# Patient Record
(demographics unavailable — no encounter records)

---

## 2024-11-20 NOTE — PHYSICAL EXAM
[Alert] : alert [Normal Voice/Communication] : normal voice/communication [Healthy Appearing] : healthy appearing [No Acute Distress] : no acute distress [Sclera] : the sclera and conjunctiva were normal [Hearing Threshold Finger Rub Not [place]] : hearing was normal [Normal Lips/Gums] : the lips and gums were normal [Oropharynx] : the oropharynx was normal [Normal Appearance] : the appearance of the neck was normal [No Neck Mass] : no neck mass was observed [No Respiratory Distress] : no respiratory distress [No Acc Muscle Use] : no accessory muscle use [Respiration, Rhythm And Depth] : normal respiratory rhythm and effort [Auscultation Breath Sounds / Voice Sounds] : lungs were clear to auscultation bilaterally [Heart Rate And Rhythm] : heart rate was normal and rhythm regular [Normal S1, S2] : normal S1 and S2 [Murmurs] : no murmurs [Bowel Sounds] : normal bowel sounds [Abdomen Tenderness] : non-tender [No Masses] : no abdominal mass palpated [Abdomen Soft] : soft [] : no hepatosplenomegaly [Oriented To Time, Place, And Person] : oriented to person, place, and time

## 2024-11-20 NOTE — ASSESSMENT
[FreeTextEntry1] : 26 yo male, hx of SIBO, previously treated with Xifaxin. COmplains of bloating, eructation. Has also epigastric pain NO nsaids. Denies weight loss, anorexia, change in bowel habits. No previous Hpylori testing nor sonogram of abdomen. Paternal hx of gallstones. NO recent travel or abx.  IMP: 1. exclude Hpylori 2. Possible cholelithiasis 3. Doubt recurrent/persistent SIBO 5. Possilble IBS  PLAN: 1. UBT 2. Sonogram of abdomen 3. cbc, celiac panel 4. RTO 4-6 weeks

## 2024-11-20 NOTE — HISTORY OF PRESENT ILLNESS
[FreeTextEntry1] : 28 yo male, hx of SIBO, previously treated with Xifaxin. COmplains of bloating, eructation. Has also epigastric pain NO nsaids. Denies weight loss, anorexia, change in bowel habits. No previous Hpylori testing nor sonogram of abdomen. Paternal hx of gallstones. NO recent travel or abx.

## 2025-01-28 NOTE — HISTORY OF PRESENT ILLNESS
[FreeTextEntry1] : 27 yo male, hx of SIBO, previously treated with Xifaxin. COmplains of bloating, eructation. Has also epigastric pain NO nsaids. Denies weight loss, anorexia, change in bowel habits. No previous Hpylori testing nor sonogram of abdomen. Paternal hx of gallstones. NO recent travel or abx.  RTO 1/28/25- abdominal sonogram was normal; hpylori ubt neg, cbc, cmp, celiac neg. States even water bloats and belching. Several years ago had course of xifaxan. Has occ constipation. [de-identified] : 11/2024 normal

## 2025-01-28 NOTE — ASSESSMENT
[FreeTextEntry1] : 29 yo male, hx of SIBO, previously treated with Xifaxin. COmplains of bloating, eructation. Has also epigastric pain NO nsaids. Denies weight loss, anorexia, change in bowel habits. No previous Hpylori testing nor sonogram of abdomen. Paternal hx of gallstones. NO recent travel or abx.  RTO 1/28/25- abdominal sonogram was normal; hpylori ubt neg, cbc, cmp, celiac neg. States even water bloats and belching. Several years ago had course of xifaxan. Has occ constipation. IMP: 1. SIBO 2. IBS PLAN: 1. xifaxan 550mg po tid x 14 days 2. RTO 3months

## 2025-03-17 NOTE — HISTORY OF PRESENT ILLNESS
[FreeTextEntry1] : 29 yo male, hx of SIBO, previously treated with Xifaxin. COmplains of bloating, eructation. Has also epigastric pain NO nsaids. Denies weight loss, anorexia, change in bowel habits. No previous Hpylori testing nor sonogram of abdomen. Paternal hx of gallstones. NO recent travel or abx.  RTO 1/28/25- abdominal sonogram was normal; hpylori ubt neg, cbc, cmp, celiac neg. States even water bloats and belching. Several years ago had course of xifaxan. Has occ constipation.  RTO 3/17/25- no help after one course xifaxan. Still with upper abdominal bloating. No weight loss. no n/v has regular bms. [de-identified] : 11/2024 normal

## 2025-03-17 NOTE — ASSESSMENT
[FreeTextEntry1] : 29 yo male, hx of SIBO, previously treated with Xifaxin. COmplains of bloating, eructation. Has also epigastric pain NO nsaids. Denies weight loss, anorexia, change in bowel habits. No previous Hpylori testing nor sonogram of abdomen. Paternal hx of gallstones. NO recent travel or abx.  RTO 1/28/25- abdominal sonogram was normal; hpylori ubt neg, cbc, cmp, celiac neg. States even water bloats and belching. Several years ago had course of xifaxan. Has occ constipation.  RTO 3/17/25- no help after one course xifaxan. Still with upper abdominal bloating. No weight loss. no n/v has regular bms. IMP: 1. SIBO 2. IBS PLAN: 1. Bactrim ds bid x 10 days 2. after above, . xifaxan 550mg po tid x 14 days 3 RTO 2 months for SIBO followup 4. hold culturelle

## 2025-05-01 NOTE — HISTORY OF PRESENT ILLNESS
[FreeTextEntry1] : 27 yo male, hx of SIBO, previously treated with Xifaxin. COmplains of bloating, eructation. Has also epigastric pain NO nsaids. Denies weight loss, anorexia, change in bowel habits. No previous Hpylori testing nor sonogram of abdomen. Paternal hx of gallstones. NO recent travel or abx.  RTO 1/28/25- abdominal sonogram was normal; hpylori ubt neg, cbc, cmp, celiac neg. States even water bloats and belching. Several years ago had course of xifaxan. Has occ constipation.  RTO 3/17/25- no help after one course xifaxan. Still with upper abdominal bloating. No weight loss. no n/v has regular bms.  RTO 5/1/25- using [facility] fiber supplement daily for SIBO, and MVI. no help with Bactrim wrt bloating. COncerned about sibo and bloating. [de-identified] : 11/2024 normal

## 2025-05-01 NOTE — ASSESSMENT
[FreeTextEntry1] : 27 yo male, hx of SIBO, previously treated with Xifaxin. COmplains of bloating, eructation. Has also epigastric pain NO nsaids. Denies weight loss, anorexia, change in bowel habits. No previous Hpylori testing nor sonogram of abdomen. Paternal hx of gallstones. NO recent travel or abx.  RTO 1/28/25- abdominal sonogram was normal; hpylori ubt neg, cbc, cmp, celiac neg. States even water bloats and belching. Several years ago had course of xifaxan. Has occ constipation.  RTO 3/17/25- no help after one course xifaxan. Still with upper abdominal bloating. No weight loss. no n/v has regular bms. IMP: 1. SIBO 2. IBS PLAN: 1. Doxy 5 dayss 2. He  was advised to undergo endoscopy to which he agreed. The procedure will be performed in [facility] with the assistance of an anesthesiologist. The procedure was explained in detail and he understood the risks of the procedure not limited  to infection, bleeding, perforation, risk of anesthesia and risk of IV site problems,emergency surgery, missed lesions  or non-diagnosis of stomach or esophageal  cancer.He/She]  was advised that he could not drive home alone, if the patient chooses to receive sedation. Further diagnostic and treatment recommendations will be based upon the procedure and any biopsies, if they are taken. 3 RTO 2 months for SIBO followup 4. hold culturelle

## 2025-05-01 NOTE — PHYSICAL EXAM
[Alert] : alert [Normal Voice/Communication] : normal voice/communication [Healthy Appearing] : healthy appearing [No Acute Distress] : no acute distress [Sclera] : the sclera and conjunctiva were normal [Normal Lips/Gums] : the lips and gums were normal [Hearing Threshold Finger Rub Not [place]] : hearing was normal [Oropharynx] : the oropharynx was normal [Normal Appearance] : the appearance of the neck was normal [No Neck Mass] : no neck mass was observed [No Respiratory Distress] : no respiratory distress [No Acc Muscle Use] : no accessory muscle use [Respiration, Rhythm And Depth] : normal respiratory rhythm and effort [Auscultation Breath Sounds / Voice Sounds] : lungs were clear to auscultation bilaterally [Heart Rate And Rhythm] : heart rate was normal and rhythm regular [Normal S1, S2] : normal S1 and S2 [Murmurs] : no murmurs [Bowel Sounds] : normal bowel sounds [Abdomen Tenderness] : non-tender [No Masses] : no abdominal mass palpated [Abdomen Soft] : soft [] : no hepatosplenomegaly [Oriented To Time, Place, And Person] : oriented to person, place, and time